# Patient Record
Sex: MALE | Race: WHITE | NOT HISPANIC OR LATINO | Employment: FULL TIME | URBAN - METROPOLITAN AREA
[De-identification: names, ages, dates, MRNs, and addresses within clinical notes are randomized per-mention and may not be internally consistent; named-entity substitution may affect disease eponyms.]

---

## 2017-10-02 ENCOUNTER — RECORDS - HEALTHEAST (OUTPATIENT)
Dept: LAB | Facility: CLINIC | Age: 57
End: 2017-10-02

## 2017-10-02 LAB — PSA SERPL-MCNC: 1 NG/ML (ref 0–3.5)

## 2019-04-25 ENCOUNTER — RECORDS - HEALTHEAST (OUTPATIENT)
Dept: LAB | Facility: CLINIC | Age: 59
End: 2019-04-25

## 2019-04-25 LAB
ALBUMIN SERPL-MCNC: 4.2 G/DL (ref 3.5–5)
ALP SERPL-CCNC: 58 U/L (ref 45–120)
ALT SERPL W P-5'-P-CCNC: 18 U/L (ref 0–45)
ANION GAP SERPL CALCULATED.3IONS-SCNC: 11 MMOL/L (ref 5–18)
AST SERPL W P-5'-P-CCNC: 19 U/L (ref 0–40)
BILIRUB SERPL-MCNC: 0.5 MG/DL (ref 0–1)
BUN SERPL-MCNC: 14 MG/DL (ref 8–22)
CALCIUM SERPL-MCNC: 9.8 MG/DL (ref 8.5–10.5)
CHLORIDE BLD-SCNC: 107 MMOL/L (ref 98–107)
CHOLEST SERPL-MCNC: 219 MG/DL
CO2 SERPL-SCNC: 22 MMOL/L (ref 22–31)
CREAT SERPL-MCNC: 1.15 MG/DL (ref 0.7–1.3)
FASTING STATUS PATIENT QL REPORTED: ABNORMAL
GFR SERPL CREATININE-BSD FRML MDRD: >60 ML/MIN/1.73M2
GLUCOSE BLD-MCNC: 92 MG/DL (ref 70–125)
HDLC SERPL-MCNC: 71 MG/DL
LDLC SERPL CALC-MCNC: 133 MG/DL
POTASSIUM BLD-SCNC: 4.9 MMOL/L (ref 3.5–5)
PROT SERPL-MCNC: 7.2 G/DL (ref 6–8)
PSA SERPL-MCNC: 1.1 NG/ML (ref 0–3.5)
SODIUM SERPL-SCNC: 140 MMOL/L (ref 136–145)
TRIGL SERPL-MCNC: 73 MG/DL

## 2020-07-23 ENCOUNTER — OFFICE VISIT (OUTPATIENT)
Dept: FAMILY MEDICINE | Facility: CLINIC | Age: 60
End: 2020-07-23
Payer: COMMERCIAL

## 2020-07-23 VITALS
SYSTOLIC BLOOD PRESSURE: 154 MMHG | HEART RATE: 75 BPM | TEMPERATURE: 97.5 F | RESPIRATION RATE: 16 BRPM | OXYGEN SATURATION: 97 % | DIASTOLIC BLOOD PRESSURE: 96 MMHG | HEIGHT: 68 IN | BODY MASS INDEX: 22.61 KG/M2 | WEIGHT: 149.2 LBS

## 2020-07-23 DIAGNOSIS — L08.9 FINGER INFECTION: Primary | ICD-10-CM

## 2020-07-23 DIAGNOSIS — Z23 ENCOUNTER FOR IMMUNIZATION: ICD-10-CM

## 2020-07-23 DIAGNOSIS — Z13.6 SCREENING FOR HYPERTENSION: ICD-10-CM

## 2020-07-23 PROCEDURE — 90471 IMMUNIZATION ADMIN: CPT | Performed by: FAMILY MEDICINE

## 2020-07-23 PROCEDURE — 99202 OFFICE O/P NEW SF 15 MIN: CPT | Mod: 25 | Performed by: FAMILY MEDICINE

## 2020-07-23 PROCEDURE — 90714 TD VACC NO PRESV 7 YRS+ IM: CPT | Performed by: FAMILY MEDICINE

## 2020-07-23 RX ORDER — CEPHALEXIN 500 MG/1
500 CAPSULE ORAL 4 TIMES DAILY
Qty: 20 CAPSULE | Refills: 0 | Status: SHIPPED | OUTPATIENT
Start: 2020-07-23 | End: 2020-07-28

## 2020-07-23 ASSESSMENT — MIFFLIN-ST. JEOR: SCORE: 1471.27

## 2020-07-23 NOTE — PROGRESS NOTES
"Subjective     Paul Canseco is a 60 year old male who presents to clinic today for the following health issues:    HPI     Chief Complaint   Patient presents with     Finger     something under fingernail- possibly wax     Patient states about a month ago he was re tiling in his house. Patient states that the tiles came with wax on them. Patient was scraping the wax off the tile with his right thumb nail. Patient states his thumb slowly got sore after that and not it red and swollen and painful if he bumps it. Patient states it looks infected. 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       Current Outpatient Medications   Medication Instructions     ASPIRIN 325 MG OR TBEC ONE DAILY       Patient Active Problem List   Diagnosis     CEREBROVASCULAR ACCIDENT     Hyperlipidemia with target LDL less than 130     Family history of thyroid disease       Blood pressure (!) 154/96, pulse 75, temperature 97.5  F (36.4  C), temperature source Tympanic, resp. rate 16, height 1.727 m (5' 8\"), weight 67.7 kg (149 lb 3.2 oz), SpO2 97 %.    Exam:  GENERAL APPEARANCE: healthy, alert and no distress  SKIN: on the radial side of the right thumb there is redness and tenderness and minimal swelling. There is no purulence. No lymphangitic streaking.   PSYCH: mentation appears normal and affect normal/bright    (L08.9) Finger infection  (primary encounter diagnosis)  Comment:   Plan: cephALEXin (KEFLEX) 500 MG capsule        Start the med today and take 2 pills now and 2 at bedtime today, then start 4 times daily for a total of 5 days. Elevate the finger and avoid local trauma.   Recheck right away if this is worsening. Modify use of the hand. Tylenol and advil may be used. Follow up as needed. The Adacel or tetanus is done today.     (Z13.6) Screening for hypertension  Comment:   Plan: For the Blood pressure, monitor and record this at rest, and the goal for the average is under 130/80.   Use the " non drug therapies. If the BP is high then bring these into the PCP      Ryley Bolivar MD    .

## 2020-07-23 NOTE — PATIENT INSTRUCTIONS
(L08.9) Finger infection  (primary encounter diagnosis)  Comment:   Plan: cephALEXin (KEFLEX) 500 MG capsule        Start the med today and take 2 pills now and 2 at bedtime today, then start 4 times daily for a total of 5 days. Elevate the finger and avoid local trauma.   Recheck right away if this is worsening. Modify use of the hand. Tylenol and advil may be used. Follow up as needed. The Adacel or tetanus is done today.     (Z13.6) Screening for hypertension  Comment:   Plan: For the Blood pressure, monitor and record this at rest, and the goal for the average is under 130/80.   Use the non drug therapies. If the BP is high then bring these into the PCP

## 2020-08-03 DIAGNOSIS — L08.9 FINGER INFECTION: ICD-10-CM

## 2020-08-04 RX ORDER — CEPHALEXIN 500 MG/1
500 CAPSULE ORAL 4 TIMES DAILY
Qty: 20 CAPSULE | Refills: 0 | OUTPATIENT
Start: 2020-08-04 | End: 2020-08-09

## 2020-09-17 ENCOUNTER — RECORDS - HEALTHEAST (OUTPATIENT)
Dept: LAB | Facility: CLINIC | Age: 60
End: 2020-09-17

## 2020-09-17 LAB
ANION GAP SERPL CALCULATED.3IONS-SCNC: 11 MMOL/L (ref 5–18)
BUN SERPL-MCNC: 12 MG/DL (ref 8–22)
CALCIUM SERPL-MCNC: 9.6 MG/DL (ref 8.5–10.5)
CHLORIDE BLD-SCNC: 106 MMOL/L (ref 98–107)
CHOLEST SERPL-MCNC: 205 MG/DL
CO2 SERPL-SCNC: 22 MMOL/L (ref 22–31)
CREAT SERPL-MCNC: 0.84 MG/DL (ref 0.7–1.3)
FASTING STATUS PATIENT QL REPORTED: ABNORMAL
GFR SERPL CREATININE-BSD FRML MDRD: >60 ML/MIN/1.73M2
GLUCOSE BLD-MCNC: 92 MG/DL (ref 70–125)
HDLC SERPL-MCNC: 68 MG/DL
LDLC SERPL CALC-MCNC: 123 MG/DL
POTASSIUM BLD-SCNC: 5.3 MMOL/L (ref 3.5–5)
PSA SERPL-MCNC: 1.5 NG/ML (ref 0–3.5)
SODIUM SERPL-SCNC: 139 MMOL/L (ref 136–145)
TRIGL SERPL-MCNC: 70 MG/DL

## 2020-11-24 ENCOUNTER — OFFICE VISIT (OUTPATIENT)
Dept: FAMILY MEDICINE | Facility: CLINIC | Age: 60
End: 2020-11-24
Payer: COMMERCIAL

## 2020-11-24 VITALS
DIASTOLIC BLOOD PRESSURE: 66 MMHG | HEART RATE: 83 BPM | WEIGHT: 149 LBS | OXYGEN SATURATION: 97 % | RESPIRATION RATE: 18 BRPM | TEMPERATURE: 97 F | BODY MASS INDEX: 22.58 KG/M2 | SYSTOLIC BLOOD PRESSURE: 140 MMHG | HEIGHT: 68 IN

## 2020-11-24 DIAGNOSIS — L03.011 PARONYCHIA, FINGER, RIGHT: Primary | ICD-10-CM

## 2020-11-24 PROCEDURE — 99213 OFFICE O/P EST LOW 20 MIN: CPT | Performed by: FAMILY MEDICINE

## 2020-11-24 RX ORDER — CEFADROXIL 500 MG/1
500 CAPSULE ORAL 2 TIMES DAILY
Qty: 14 CAPSULE | Refills: 0 | Status: SHIPPED | OUTPATIENT
Start: 2020-11-24 | End: 2020-12-02

## 2020-11-24 ASSESSMENT — MIFFLIN-ST. JEOR: SCORE: 1470.36

## 2020-11-24 NOTE — PROGRESS NOTES
"Subjective     Paul Canseco is a 60 year old male who presents to clinic today for the following health issues:    HPI         Concern - R thumb has wax on the end of thumb   Onset: 7-2020  Description: Patient was putting tile down and felt something go into his thumb, ever since has had trouble with swelling and pain, patient has  Soaked  it to try to push some of the wax to the surface.  Intensity: moderate, severe at times   Progression of Symptoms:  Worsening with swollen and infection   Accompanying Signs & Symptoms: patient was scabbing tile and got some wax inhis thumb     Precipitating factors:        Worsened by: with use   Alleviating factors:        Improved by: none   Therapies tried and outcome: Patient has been soaking and trying to push the wax to the surface     Patient reports that since July he has been struggling with the wax in his thumb.  He was prescribed Keflex for treatment at that time.  He has been soaking finger and trying to push the wax to the surface.  He states that 4 days ago it became infected and he was able to squeeze out some wax and also purulent material.  He continues to still have pain of the thumb and notes that the wax is still there.  Today in clinic after pressing on the thumb purulent material was expressed and a chunk of wax was removed.   Patient denies any fevers, chills, rigors, spreading erythema or any other concerns at this time.    Review of Systems   Constitutional: Negative fevers   HEENT: Negative vision changes, hearing changes, difficulty with swallowing.  CV: Negative chest pain   Resp: Negative shortness of breath, significant cough, wheezing  GI: Negative nausea, vomiting, diarrhea, constipation, blood in stool  MSK: thumb pain         Objective    BP (!) 140/66   Pulse 83   Temp 97  F (36.1  C) (Tympanic)   Resp 18   Ht 1.727 m (5' 8\")   Wt 67.6 kg (149 lb)   SpO2 97%   BMI 22.66 kg/m    Body mass index is 22.66 kg/m .  Physical Exam   General: " Alert, cooperative no acute distress  Hand: Right thumb is erythematous with the piece of tan-colored wax present.  After manipulation of the thumb there was expression of moderate amount of purulent smelly material.  The piece of wax was removed manually.  There is no signs of any streaking erythema into the wrist or forearm.            Assessment & Plan     Paul was seen today for thumb discomfort.    Diagnoses and all orders for this visit:    Paronychia, finger, right  -Patient with chronic paronychia and foreign body present in the right thumb.  After manual pressure purulent material was expressed and wax was removed manually.  Patient tolerated the procedure well.  Discussed with patient that he will need to keep the area clean and dry when he is working.  Advised that he proceed with warm water soaks 2 times a day for at least 10 minutes.  Will treat with Duricef 500 mg twice daily for 7 days. Medication discussed. No MRSA risk factors. Allergy to penicillin, tolerated keflex in July 2020.  Patient to follow-up in clinic in 7 days.  All questions answered.  -     cefadroxil (DURICEF) 500 MG capsule; Take 1 capsule (500 mg) by mouth 2 times daily              Return in about 1 week (around 12/1/2020).    Brijesh Barlow, [facility]

## 2020-12-02 ENCOUNTER — OFFICE VISIT (OUTPATIENT)
Dept: DERMATOLOGY | Facility: CLINIC | Age: 60
End: 2020-12-02
Payer: COMMERCIAL

## 2020-12-02 ENCOUNTER — TELEPHONE (OUTPATIENT)
Dept: FAMILY MEDICINE | Facility: CLINIC | Age: 60
End: 2020-12-02

## 2020-12-02 ENCOUNTER — ANCILLARY PROCEDURE (OUTPATIENT)
Dept: GENERAL RADIOLOGY | Facility: CLINIC | Age: 60
End: 2020-12-02
Attending: FAMILY MEDICINE
Payer: COMMERCIAL

## 2020-12-02 ENCOUNTER — TELEPHONE (OUTPATIENT)
Dept: DERMATOLOGY | Facility: CLINIC | Age: 60
End: 2020-12-02

## 2020-12-02 ENCOUNTER — OFFICE VISIT (OUTPATIENT)
Dept: FAMILY MEDICINE | Facility: CLINIC | Age: 60
End: 2020-12-02
Payer: COMMERCIAL

## 2020-12-02 VITALS
TEMPERATURE: 97 F | WEIGHT: 149 LBS | OXYGEN SATURATION: 98 % | SYSTOLIC BLOOD PRESSURE: 130 MMHG | HEART RATE: 74 BPM | HEIGHT: 68 IN | DIASTOLIC BLOOD PRESSURE: 82 MMHG | BODY MASS INDEX: 22.58 KG/M2 | RESPIRATION RATE: 16 BRPM

## 2020-12-02 VITALS — SYSTOLIC BLOOD PRESSURE: 130 MMHG | RESPIRATION RATE: 16 BRPM | HEART RATE: 83 BPM | DIASTOLIC BLOOD PRESSURE: 78 MMHG

## 2020-12-02 DIAGNOSIS — M79.5 FOREIGN BODY (FB) IN SOFT TISSUE: Primary | ICD-10-CM

## 2020-12-02 DIAGNOSIS — M79.644 THUMB PAIN, RIGHT: ICD-10-CM

## 2020-12-02 DIAGNOSIS — S60.351D FOREIGN BODY OF RIGHT THUMB, SUBSEQUENT ENCOUNTER: ICD-10-CM

## 2020-12-02 DIAGNOSIS — M79.644 THUMB PAIN, RIGHT: Primary | ICD-10-CM

## 2020-12-02 PROCEDURE — 99213 OFFICE O/P EST LOW 20 MIN: CPT | Performed by: FAMILY MEDICINE

## 2020-12-02 PROCEDURE — 99202 OFFICE O/P NEW SF 15 MIN: CPT | Performed by: DERMATOLOGY

## 2020-12-02 PROCEDURE — 73140 X-RAY EXAM OF FINGER(S): CPT | Mod: RT | Performed by: RADIOLOGY

## 2020-12-02 RX ORDER — DOXYCYCLINE 100 MG/1
100 CAPSULE ORAL 2 TIMES DAILY
Qty: 14 CAPSULE | Refills: 0 | Status: SHIPPED | OUTPATIENT
Start: 2020-12-02

## 2020-12-02 ASSESSMENT — MIFFLIN-ST. JEOR: SCORE: 1470.36

## 2020-12-02 NOTE — PROGRESS NOTES
"Subjective     Paul Canseco is a 60 year old male who presents to clinic today for the following health issues:    HPI         Concern - THUMB  Onset: July 2020  Description: was helping a friend christine when wax was imbedded   Intensity: 8/10  Progression of Symptoms:  same  Accompanying Signs & Symptoms: nail on thumb - patient has been picking away at it.  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    -He returns to clinic today for concern that there is still wax in his thumb.  He has been taking the Duricef medication as prescribed, and also soaking the thumb. Finished Duricef Monday night. He has not noted any fevers, chills or rigors.  No streaking or redness up thumb or hand.  Continues to have pain in the right thumb and thinks it is infected again.     Review of Systems   Constitutional, HEENT, cardiovascular, pulmonary, gi and gu systems are negative, except as otherwise noted.      Objective    /82   Pulse 74   Temp 97  F (36.1  C) (Tympanic)   Resp 16   Ht 1.727 m (5' 8\")   Wt 67.6 kg (149 lb)   SpO2 98%   BMI 22.66 kg/m    Body mass index is 22.66 kg/m .  Physical Exam   General: Alert, cooperative no acute distress  Right hand: On the radial aspect of his right thumb there is mild erythema.  There is an opening near the radial nail bed. Tender to palpation over the distal thumb. No streaking up the thumb or forearm. Pain with flexion at IP joint. No pain with extension. Sensation intact over the thumb and hand. Radial pulse strong. No pain or decrease ROM in other digits of the hand.     Xray - IMPRESSION: Soft tissue wound/ulcer. There is bone deficiency along  the radial aspect of the thumb distal phalanx. This has a chronic  appearance and could relate to prior infection, prior surgery, or " old  injury. No radiopaque foreign body identified.          Assessment & Plan     Paul was seen today for thumb discomfort.    Diagnoses and all orders for this visit:    52-year-old male with initial injury/encounter in July 2020.  He was tiling that way extremity the finger.  He was placed on Keflex and sent home at that time.  Patient returned on 11/24 and at that time had a large piece of wax on the surface of the finger which was removed with gentle traction.  Also had drainage from the thumb after wax removal.  He was placed on Duricef and instructed to proceed with daily soaks. With plans to return for further evaluation. Today returns with concerns of recurrent infection and concerns for retained wax. Xray was obtained as above. Will have patient be seen be Dermatology later today for further evaluation and cares. Greatly appreciate Dermatology seeing patient.   Thumb pain, right  -     XR Finger Right G/E 2 Views; Future  Dermatology referral     Likely Foreign body of right thumb, subsequent encounter  - Dermatology referral           Follow up with Dermatology today.     Brijesh Barlow, [facility]

## 2020-12-02 NOTE — PATIENT INSTRUCTIONS
Orthopedics today at 240     Ortho to determine need for antibiotics. If wish for myself to prescribe I will.

## 2020-12-02 NOTE — TELEPHONE ENCOUNTER
Attempting to work in with Delores in [facility] this afternoon.  Madelin Malhotra RN

## 2020-12-02 NOTE — TELEPHONE ENCOUNTER
Reason for Call:  Other call back    Detailed comments: pt wife calling upset that pt was seen in office today and nothing some done to have wax taken out of his finger. Pt was seen at a different clinic and was referred to Dr. Estrella thinking he was going to have this taken care of today.     Phone Number Patient can be reached at: Other phone number:  [phone] or [phone] - Aisha    Best Time: any     Can we leave a detailed message on this number? YES    Call taken on 12/2/2020 at 3:22 PM by Genesis Kamara

## 2020-12-02 NOTE — PROGRESS NOTES
Paul Canseco is an extremely pleasant 60 year old year old male patient here today for foreign body in right thumb.   .  Patient states this has been present for months.  Patient reports the following symptoms:  painful.  Patient reports the following previous treatments excisoin.  These treatments did not work.  Patient reports the following modifying factors none.  Associated symptoms: none.  Patient has no other skin complaints today.  Remainder of the HPI, Meds, PMH, Allergies, FH, and SH was reviewed in chart.    No past medical history on file.    Past Surgical History:   Procedure Laterality Date     COLONOSCOPY  2012    Procedure:COLONOSCOPY; Colonoscopy-Dr. Jaeger; Surgeon:SOTO TOM; Location:[facility]     VASECTOMY          Family History   Problem Relation Age of Onset     Breast Cancer Mother         late 50's; lives in [place]     Arthritis Mother      Breast Cancer Maternal Grandmother      Cerebrovascular Disease Paternal Grandfather         late 60's, ETOH     Thyroid Disease Sister        Social History     Socioeconomic History     Marital status:      Spouse name: Aisha Canseco     Number of children: 2     Years of education: 12+     Highest education level: Not on file   Occupational History     Occupation:      Employer: SELF EMPLOYED.   Social Needs     Financial resource strain: Not on file     Food insecurity     Worry: Not on file     Inability: Not on file     Transportation needs     Medical: Not on file     Non-medical: Not on file   Tobacco Use     Smoking status: Former Smoker     Quit date: 2006     Years since quittin.0     Smokeless tobacco: Never Used     Tobacco comment: cigar once a week   Substance and Sexual Activity     Alcohol use: Yes     Comment: 2 beers a day     Drug use: No     Sexual activity: Yes     Partners: Female     Birth control/protection: Surgical     Comment: Patient has had a vasectomy   Lifestyle     Physical activity      Days per week: Not on file     Minutes per session: Not on file     Stress: Not on file   Relationships     Social connections     Talks on phone: Not on file     Gets together: Not on file     Attends [employer] service: Not on file     Active member of club or organization: Not on file     Attends meetings of clubs or organizations: Not on file     Relationship status: Not on file     Intimate partner violence     Fear of current or ex partner: Not on file     Emotionally abused: Not on file     Physically abused: Not on file     Forced sexual activity: Not on file   Other Topics Concern     Parent/sibling w/ CABG, MI or angioplasty before 65F 55M? No   Social History Narrative     Not on file       Outpatient Encounter Medications as of 12/2/2020   Medication Sig Dispense Refill     ASPIRIN 325 MG OR TBEC ONE DAILY 100 3     Omega-3 Fatty Acids (FISH OIL OMEGA-3 PO)        No facility-administered encounter medications on file as of 12/2/2020.              Review Of Systems  Skin: As above  Eyes: negative  Ears/Nose/Throat: negative  Respiratory: No shortness of breath, dyspnea on exertion, cough, or hemoptysis  Cardiovascular: negative  Gastrointestinal: negative  Genitourinary: negative  Musculoskeletal: negative  Neurologic: negative  Psychiatric: negative  Hematologic/Lymphatic/Immunologic: negative  Endocrine: negative      O:   NAD, WDWN, Alert & Oriented, Mood & Affect wnl, Vitals stable   Here today alone   /78 (BP Location: Right arm, Patient Position: Sitting, Cuff Size: Adult Regular)   Pulse 83   Resp 16    General appearance normal   Vitals stable   Alert, oriented and in no acute distress     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      Eyes: Conjunctivae/lids:Normal     ENT:  Lips, buccal mucosa, tongue: normal    MSK:Normal    Cardiovascular: peripheral edema none    Pulm: Breathing Normal    Neuro/Psych: Orientation:Alert and Orientedx3 ; Mood/Affect:normal       A/P:  1. Foreign body  Nail avulsion and exciision discussed with patient   Will schedule Monday  Doxy twice daily

## 2020-12-02 NOTE — TELEPHONE ENCOUNTER
Reason for call:  Patient reporting a symptom    Symptom or request: Pt's spouse calling, very angry - Pt in background.  Pt was seen in clinic today for a finger foreign body and was set up with [facility] for appt today.  [facility] called and cancelled that appt and told pt that he needs to be seen @ [facility].  Please call patient/spouse and advise ASAP.      Duration (how long have symptoms been present): ongoing    Have you been treated for this before? Yes    Additional comments:     Phone Number patient can be reached at:  Home number on file [phone] (home)    Best Time:  any    Can we leave a detailed message on this number:  YES    Call taken on 12/2/2020 at 10:26 AM by Selena Solorio

## 2020-12-02 NOTE — NURSING NOTE
"Initial /78 (BP Location: Right arm, Patient Position: Sitting, Cuff Size: Adult Regular)   Pulse 83   Resp 16  Estimated body mass index is 22.66 kg/m  as calculated from the following:    Height as of an earlier encounter on 12/2/20: 1.727 m (5' 8\").    Weight as of an earlier encounter on 12/2/20: 67.6 kg (149 lb). .    Tessa Ortiz, [facility]    "

## 2020-12-02 NOTE — LETTER
2020         RE: Paul Canseco  [id number] [address] [facility] [address]        Dear Colleague,    Thank you for referring your patient, Paul Canseco, to the [facility]. Please see a copy of my visit note below.    Paul Canseco is an extremely pleasant 60 year old year old male patient here today for foreign body in right thumb.   .  Patient states this has been present for months.  Patient reports the following symptoms:  painful.  Patient reports the following previous treatments excisoin.  These treatments did not work.  Patient reports the following modifying factors none.  Associated symptoms: none.  Patient has no other skin complaints today.  Remainder of the HPI, Meds, PMH, Allergies, FH, and SH was reviewed in chart.    No past medical history on file.    Past Surgical History:   Procedure Laterality Date     COLONOSCOPY  2012    Procedure:COLONOSCOPY; Colonoscopy-Dr. Jaeger; Surgeon:SOTO TOM; Location:[facility]     VASECTOMY          Family History   Problem Relation Age of Onset     Breast Cancer Mother         late 50's; lives in [place]     Arthritis Mother      Breast Cancer Maternal Grandmother      Cerebrovascular Disease Paternal Grandfather         late 60's, ETOH     Thyroid Disease Sister        Social History     Socioeconomic History     Marital status:      Spouse name: Aisha Canseco     Number of children: 2     Years of education: 12+     Highest education level: Not on file   Occupational History     Occupation:      Employer: SELF EMPLOYED.   Social Needs     Financial resource strain: Not on file     Food insecurity     Worry: Not on file     Inability: Not on file     Transportation needs     Medical: Not on file     Non-medical: Not on file   Tobacco Use     Smoking status: Former Smoker     Quit date: 2006     Years since quittin.0     Smokeless tobacco: Never Used     Tobacco comment: cigar once a week   Substance  and Sexual Activity     Alcohol use: Yes     Comment: 2 beers a day     Drug use: No     Sexual activity: Yes     Partners: Female     Birth control/protection: Surgical     Comment: Patient has had a vasectomy   Lifestyle     Physical activity     Days per week: Not on file     Minutes per session: Not on file     Stress: Not on file   Relationships     Social connections     Talks on phone: Not on file     Gets together: Not on file     Attends [employer] service: Not on file     Active member of club or organization: Not on file     Attends meetings of clubs or organizations: Not on file     Relationship status: Not on file     Intimate partner violence     Fear of current or ex partner: Not on file     Emotionally abused: Not on file     Physically abused: Not on file     Forced sexual activity: Not on file   Other Topics Concern     Parent/sibling w/ CABG, MI or angioplasty before 65F 55M? No   Social History Narrative     Not on file       Outpatient Encounter Medications as of 12/2/2020   Medication Sig Dispense Refill     ASPIRIN 325 MG OR TBEC ONE DAILY 100 3     Omega-3 Fatty Acids (FISH OIL OMEGA-3 PO)        No facility-administered encounter medications on file as of 12/2/2020.              Review Of Systems  Skin: As above  Eyes: negative  Ears/Nose/Throat: negative  Respiratory: No shortness of breath, dyspnea on exertion, cough, or hemoptysis  Cardiovascular: negative  Gastrointestinal: negative  Genitourinary: negative  Musculoskeletal: negative  Neurologic: negative  Psychiatric: negative  Hematologic/Lymphatic/Immunologic: negative  Endocrine: negative      O:   NAD, WDWN, Alert & Oriented, Mood & Affect wnl, Vitals stable   Here today alone   /78 (BP Location: Right arm, Patient Position: Sitting, Cuff Size: Adult Regular)   Pulse 83   Resp 16    General appearance normal   Vitals stable   Alert, oriented and in no acute distress     R thumb proximal naul fold dystrophy with keratotic red  papule under nail   Tender and indruated   The remainder of expanded problem focused exam was normal; the following areas were examined:  , conjunctiva/lids, face, neck, , chest, digits/nails, RUE, LUE.      Eyes: Conjunctivae/lids:Normal     ENT: Lips, buccal mucosa, tongue: normal    MSK:Normal    Cardiovascular: peripheral edema none    Pulm: Breathing Normal    Neuro/Psych: Orientation:Alert and Orientedx3 ; Mood/Affect:normal       A/P:  1. Foreign body  Nail avulsion and exciision discussed with patient   Will schedule Monday  Doxy twice daily        Again, thank you for allowing me to participate in the care of your patient.        Sincerely,        Juan Diego Estrella MD

## 2020-12-03 NOTE — TELEPHONE ENCOUNTER
Hello    This was a consult.    I had no idea what I was going to see.    He got schedule for the procedure next Monday.      There is no office charge for the procedure visit.  Only the consult.

## 2020-12-03 NOTE — TELEPHONE ENCOUNTER
"Called patient and was given consent to communicate with wife- Aisha. Called Aisha. Patient had appointment 12/2/2020 @ 2:30 pm.    She stated they were told at time of scheduling  appointment that Dr. Estrella \"would take care of it today\". Was under assumption that meant that procedure was going to take place, \"otherwise we would've just went to [facility]\".     Apologized for mis-communication regarding appointment and informed her of general process of consults taking place before procedures. Wife stated \"we get 3 free office visits a year, we will not be having this count as one of them. We will not be paying for it either\".     Patient's wife understanding on phone, but frustrated and  requesting call by management regarding, \"having this bill stopped ASAP\".  Barb can you help out with this and call patient's wife? Okay to call per patient- [phone]- Aisha    Discussed with staff that were present during office visit. Patient was seen as a consult. Double booked for appointment to accommodate need to be seen. Per staff, Dr. Estrella spent ample amount of time with patient discussing and assessing thumb, then  formulated plan and scheduled procedure for following Monday.     Thank you,   Erich CAMPO           "

## 2020-12-03 NOTE — TELEPHONE ENCOUNTER
Discussed with JAHAIRA Day-PCS. She will handle with billing. Informed patient's wife. Thankful for assistance and does not require further follow up at this time.     Erich PALACIOS RN   [facility]

## 2020-12-15 ENCOUNTER — OFFICE VISIT (OUTPATIENT)
Dept: DERMATOLOGY | Facility: CLINIC | Age: 60
End: 2020-12-15
Payer: COMMERCIAL

## 2020-12-15 VITALS — SYSTOLIC BLOOD PRESSURE: 154 MMHG | DIASTOLIC BLOOD PRESSURE: 90 MMHG | HEART RATE: 72 BPM | OXYGEN SATURATION: 98 %

## 2020-12-15 DIAGNOSIS — M79.5 FOREIGN BODY (FB) IN SOFT TISSUE: Primary | ICD-10-CM

## 2020-12-15 PROCEDURE — 11750 EXCISION NAIL&NAIL MATRIX: CPT | Performed by: DERMATOLOGY

## 2020-12-15 RX ORDER — DOXYCYCLINE 100 MG/1
100 CAPSULE ORAL 2 TIMES DAILY
Qty: 14 CAPSULE | Refills: 0 | Status: SHIPPED | OUTPATIENT
Start: 2020-12-15 | End: 2020-12-24

## 2020-12-15 NOTE — NURSING NOTE
Chief Complaint   Patient presents with     Derm Problem     thumb nail removal       Vitals:    12/15/20 0817   BP: (!) 154/90   Pulse: 72   SpO2: 98%     Wt Readings from Last 1 Encounters:   12/02/20 67.6 kg (149 lb)       Loreto Rdz LPN.................12/15/2020

## 2020-12-15 NOTE — PATIENT INSTRUCTIONS
Open Wound Care     for ______________        ? No strenuous activity for 48 hours    ? Take Tylenol as needed for discomfort.                                                .         ? Do not drink alcoholic beverages for 48 hours.    ? Keep the pressure bandage in place for 24 hours. If the bandage becomes blood tinged or loose, reinforce it with gauze and tape.        (Refer to the reverse side of this page for management of bleeding).    ? Remove bandage in 24 hours and begin wound care as follows:     1. Clean area with tap water using a Q tip or gauze pad, (shower / bathe normally)  2. Dry wound with Q tip or gauze pad  3. Apply Aquaphor, Vaseline, Polysporin or Bacitracin Ointment with a Q tip    Do NOT use Neosporin Ointment *  4. Cover the wound with a telfa, then gauze and wrap with Coban.  5. Repeat wound care once a day until wound is completely healed.    It is an old wives tale that a wound heals better when it is exposed to air and allowed to dry out. The wound will heal faster with a better cosmetic result if it is kept moist with ointment and covered with a bandage.  Do not let the wound dry out.      Supplies Needed:                Qtips or gauze pads                Polysporin or Bacitracin Ointment                Bandaids or nonstick gauze pads and paper tape    Wound care kits and brown paper tape are available for purchase at   the pharmacy.    BLEEDIN. Use tightly rolled up gauze or cloth to apply direct pressure over the bandage for 20   minutes.  2. Reapply pressure for an additional 20 minutes if necessary  3. Call the office or go to the nearest emergency room if pressure fails to stop the bleeding.  4. Use additional gauze and tape to maintain pressure once the bleeding has stopped.  5. Begin wound care 24 hours after surgery as directed.                  WOUND HEALING    1. One week after surgery a pink / red halo will form around the outside of the wound.   This is new  skin.  2. The center of the wound will appear yellowish white and produce some drainage.  3. The pink halo will slowly migrate in toward the center of the wound until the wound is covered with new shiny pink skin.  4. There will be no more drainage when the wound is completely healed.  5. It will take six months to one year for the redness to fade.  6. The scar may be itchy, tight and sensitive to extreme temperatures for a year after the surgery.  7. Massaging the area several times a day for several minutes after the wound is completely healed will help the scar soften and normalize faster. Begin massage only after healing is complete.      In case of emergency call: Dr Estrella: [phone]     [facility]: [phone]    [facility]: [phone]

## 2020-12-15 NOTE — PROGRESS NOTES
Paul Canseco is an extremely pleasant 60 year old year old male patient here today for for foreign body in thumb.  He notes he got wax in thumb.   .  Patient states this has been present for months.  Patient reports the following symptoms:  painful.  Patient reports the following previous treatments I+d.  These treatments did not work.  Patient reports the following modifying factors none.  Associated symptoms: none.  Patient has no other skin complaints today.  Remainder of the HPI, Meds, PMH, Allergies, FH, and SH was reviewed in chart.    History reviewed. No pertinent past medical history.    Past Surgical History:   Procedure Laterality Date     COLONOSCOPY  2012    Procedure:COLONOSCOPY; Colonoscopy-Dr. Jaeger; Surgeon:SOTO TOM; Location:[facility]     VASECTOMY          Family History   Problem Relation Age of Onset     Breast Cancer Mother         late 50's; lives in [place]     Arthritis Mother      Breast Cancer Maternal Grandmother      Cerebrovascular Disease Paternal Grandfather         late 60's, ETOH     Thyroid Disease Sister        Social History     Socioeconomic History     Marital status:      Spouse name: Aisha Canseco     Number of children: 2     Years of education: 12+     Highest education level: Not on file   Occupational History     Occupation:      Employer: SELF EMPLOYED.   Social Needs     Financial resource strain: Not on file     Food insecurity     Worry: Not on file     Inability: Not on file     Transportation needs     Medical: Not on file     Non-medical: Not on file   Tobacco Use     Smoking status: Former Smoker     Quit date: 2006     Years since quittin.0     Smokeless tobacco: Never Used     Tobacco comment: cigar once a week   Substance and Sexual Activity     Alcohol use: Yes     Comment: 2 beers a day     Drug use: No     Sexual activity: Yes     Partners: Female     Birth control/protection: Surgical     Comment: Patient has had a  vasectomy   Lifestyle     Physical activity     Days per week: Not on file     Minutes per session: Not on file     Stress: Not on file   Relationships     Social connections     Talks on phone: Not on file     Gets together: Not on file     Attends Yarsani service: Not on file     Active member of club or organization: Not on file     Attends meetings of clubs or organizations: Not on file     Relationship status: Not on file     Intimate partner violence     Fear of current or ex partner: Not on file     Emotionally abused: Not on file     Physically abused: Not on file     Forced sexual activity: Not on file   Other Topics Concern     Parent/sibling w/ CABG, MI or angioplasty before 65F 55M? No   Social History Narrative     Not on file       Outpatient Encounter Medications as of 12/15/2020   Medication Sig Dispense Refill     ASPIRIN 325 MG OR TBEC ONE DAILY 100 3     Omega-3 Fatty Acids (FISH OIL OMEGA-3 PO)        doxycycline monohydrate (MONODOX) 100 MG capsule Take 1 capsule (100 mg) by mouth 2 times daily (Patient not taking: Reported on 12/15/2020) 14 capsule 0     No facility-administered encounter medications on file as of 12/15/2020.              Review Of Systems  Skin: As above  Eyes: negative  Ears/Nose/Throat: negative  Respiratory: No shortness of breath, dyspnea on exertion, cough, or hemoptysis  Cardiovascular: negative  Gastrointestinal: negative  Genitourinary: negative  Musculoskeletal: negative  Neurologic: negative  Psychiatric: negative  Hematologic/Lymphatic/Immunologic: negative  Endocrine: negative      O:   NAD, WDWN, Alert & Oriented, Mood & Affect wnl, Vitals stable   Here today alone   BP (!) 154/90   Pulse 72   SpO2 98%    General appearance normal   Vitals stable   Alert, oriented and in no acute distress     R thumb with medial dystrophy and subungual debirs      Eyes: Conjunctivae/lids:Normal     ENT: Lips, buccal mucosa, tongue: normal    MSK:Normal    Cardiovascular:  peripheral edema none    Pulm: Breathing Normal    Neuro/Psych: Orientation:Alert and Orientedx3 ; Mood/Affect:normal       A/P:  1. R thumb foreign body  NAIL AVULSION After consent, anesthesia with 1% lidocaine, and prep, the lateral portion of the nail was elevated from the nail bed, using a periosteal elevator, the lateral 4mm of the nail was avulsed using sterile nail splitter, cautery was applied to matrix at a current of 10, for 5 seconds and then repeated.      The medial nail fold was drained of 2cc of a waxy white material    The site was dressed in the usual fashion,   Management: Home Instructions   Keep foot elevated for first 24 hours   Change dressing in 24 hours   Consider daily antibiotic ointment (e.g. Bacitracin) until heeled   Water exposure is controversial   Some recommend only showering, but no soakings   Others soak foot in warm soapy water 2-4 times daily for 4-7 days   Avoid trauma to toe for first 2 weeks   Wear loose-fitting shoes   Avoid Running, jumping or other potential injury   Observe for signs of infection     Return to clinic one month

## 2020-12-15 NOTE — LETTER
12/15/2020         RE: Paul Canseco  [address] [facility] [id number]        Dear Colleague,    Thank you for referring your patient, Paul Canseco, to the [facility]. Please see a copy of my visit note below.    Paul Canseco is an extremely pleasant 60 year old year old male patient here today for for foreign body in thumb.  He notes he got wax in thumb.   .  Patient states this has been present for months.  Patient reports the following symptoms:  painful.  Patient reports the following previous treatments I+d.  These treatments did not work.  Patient reports the following modifying factors none.  Associated symptoms: none.  Patient has no other skin complaints today.  Remainder of the HPI, Meds, PMH, Allergies, FH, and SH was reviewed in chart.    History reviewed. No pertinent past medical history.    Past Surgical History:   Procedure Laterality Date     COLONOSCOPY  2012    Procedure:COLONOSCOPY; Colonoscopy-Dr. Jaeger; Surgeon:SOTO TOM; Location:[facility]     VASECTOMY          Family History   Problem Relation Age of Onset     Breast Cancer Mother         late 50's; lives in [place]     Arthritis Mother      Breast Cancer Maternal Grandmother      Cerebrovascular Disease Paternal Grandfather         late 60's, ETOH     Thyroid Disease Sister        Social History     Socioeconomic History     Marital status:      Spouse name: Aisha Canseco     Number of children: 2     Years of education: 12+     Highest education level: Not on file   Occupational History     Occupation:      Employer: SELF EMPLOYED.   Social Needs     Financial resource strain: Not on file     Food insecurity     Worry: Not on file     Inability: Not on file     Transportation needs     Medical: Not on file     Non-medical: Not on file   Tobacco Use     Smoking status: Former Smoker     Quit date: 2006     Years since quittin.0     Smokeless tobacco: Never Used      Tobacco comment: cigar once a week   Substance and Sexual Activity     Alcohol use: Yes     Comment: 2 beers a day     Drug use: No     Sexual activity: Yes     Partners: Female     Birth control/protection: Surgical     Comment: Patient has had a vasectomy   Lifestyle     Physical activity     Days per week: Not on file     Minutes per session: Not on file     Stress: Not on file   Relationships     Social connections     Talks on phone: Not on file     Gets together: Not on file     Attends Restoration service: Not on file     Active member of club or organization: Not on file     Attends meetings of clubs or organizations: Not on file     Relationship status: Not on file     Intimate partner violence     Fear of current or ex partner: Not on file     Emotionally abused: Not on file     Physically abused: Not on file     Forced sexual activity: Not on file   Other Topics Concern     Parent/sibling w/ CABG, MI or angioplasty before 65F 55M? No   Social History Narrative     Not on file       Outpatient Encounter Medications as of 12/15/2020   Medication Sig Dispense Refill     ASPIRIN 325 MG OR TBEC ONE DAILY 100 3     Omega-3 Fatty Acids (FISH OIL OMEGA-3 PO)        doxycycline monohydrate (MONODOX) 100 MG capsule Take 1 capsule (100 mg) by mouth 2 times daily (Patient not taking: Reported on 12/15/2020) 14 capsule 0     No facility-administered encounter medications on file as of 12/15/2020.              Review Of Systems  Skin: As above  Eyes: negative  Ears/Nose/Throat: negative  Respiratory: No shortness of breath, dyspnea on exertion, cough, or hemoptysis  Cardiovascular: negative  Gastrointestinal: negative  Genitourinary: negative  Musculoskeletal: negative  Neurologic: negative  Psychiatric: negative  Hematologic/Lymphatic/Immunologic: negative  Endocrine: negative      O:   NAD, WDWN, Alert & Oriented, Mood & Affect wnl, Vitals stable   Here today alone   BP (!) 154/90   Pulse 72   SpO2 98%    General  appearance normal   Vitals stable   Alert, oriented and in no acute distress     R thumb with medial dystrophy and subungual debirs      Eyes: Conjunctivae/lids:Normal     ENT: Lips, buccal mucosa, tongue: normal    MSK:Normal    Cardiovascular: peripheral edema none    Pulm: Breathing Normal    Neuro/Psych: Orientation:Alert and Orientedx3 ; Mood/Affect:normal       A/P:  1. R thumb foreign body  NAIL AVULSION After consent, anesthesia with 1% lidocaine, and prep, the lateral portion of the nail was elevated from the nail bed, using a periosteal elevator, the lateral 4mm of the nail was avulsed using sterile nail splitter, cautery was applied to matrix at a current of 10, for 5 seconds and then repeated.      The medial nail fold was drained of 2cc of a waxy white material    The site was dressed in the usual fashion,   Management: Home Instructions   Keep foot elevated for first 24 hours   Change dressing in 24 hours   Consider daily antibiotic ointment (e.g. Bacitracin) until heeled   Water exposure is controversial   Some recommend only showering, but no soakings   Others soak foot in warm soapy water 2-4 times daily for 4-7 days   Avoid trauma to toe for first 2 weeks   Wear loose-fitting shoes   Avoid Running, jumping or other potential injury   Observe for signs of infection     Return to clinic one month        Again, thank you for allowing me to participate in the care of your patient.        Sincerely,        Juan Diego Estrella MD

## 2020-12-23 ENCOUNTER — TELEPHONE (OUTPATIENT)
Dept: DERMATOLOGY | Facility: CLINIC | Age: 60
End: 2020-12-23

## 2020-12-23 DIAGNOSIS — M79.5 FOREIGN BODY (FB) IN SOFT TISSUE: ICD-10-CM

## 2020-12-23 NOTE — TELEPHONE ENCOUNTER
"Spoke to patient.   \"I had an anyeursym on the end of my finger that I don't want to come back, I have had this infection for so long, that I don't want it to come back, so I wanted more antibiotic...\"    I asked if the area was painful, red, warm to the touch or if any foul odor was present, which he denied all.     \"It only bothers me when I try to sleep, I get some sharp pains once in a while, but it doesn't stink anymore and it is getting smaller. The first time, the antibiotic didn't do anything, so I tried it again, but I just want to be sure to get rid of this infection\"    I did advise sharp pain can be from nerve regeneration after excision and can be expected. It doesn't sound like any symptoms of infection are present at this time and since he has had 2 courses of 7 days of Doxycycline, I suspect the antibiotic has done it's job, but patient is asking that I check with Provider.     Please advise. Jamila Diaz RN                  "

## 2020-12-23 NOTE — TELEPHONE ENCOUNTER
Spouse states that she would like you to talk to any provider about refilling this medication today.  She would like a phone call again also today.

## 2020-12-23 NOTE — TELEPHONE ENCOUNTER
Requested Prescriptions   Pending Prescriptions Disp Refills     doxycycline monohydrate (MONODOX) 100 MG capsule 14 capsule 0     Sig: Take 1 capsule (100 mg) by mouth 2 times daily       There is no refill protocol information for this order        Last Written Prescription Date:    Last Fill Quantity: ,  # refills:    Last office visit: 12/15/2020 with prescribing provider:  Delores Monk Office Visit:

## 2020-12-23 NOTE — TELEPHONE ENCOUNTER
Left message for patient to call back and clarify if he requested the refill and if he did, then is he he still having symptoms of infection in his thumb.  Maryse DE LA TORRE RN BSN PHN  [facility]

## 2020-12-23 NOTE — TELEPHONE ENCOUNTER
Spouse Aisha calling back. She states that they did request this refill. Paul is still having issues with his finger. Nail is looking better, but the fingertip looks infected.

## 2020-12-24 RX ORDER — DOXYCYCLINE 100 MG/1
100 CAPSULE ORAL 2 TIMES DAILY
Qty: 14 CAPSULE | Refills: 0 | Status: SHIPPED | OUTPATIENT
Start: 2020-12-24

## 2020-12-24 NOTE — TELEPHONE ENCOUNTER
Yes it sound more like nerve related pain.   If it becomes red, hot, swollen, draining then I would be concerned about infection.  Can open mychart account and send photo so we can see how his finger is healing.   Can take tylenol or ibuprofen at bedtime to help with discomfort.    pain: pain:

## 2020-12-24 NOTE — TELEPHONE ENCOUNTER
"Spoke to patient/Spouse and advised of need for Consent to communicate.    I did advise Doxycyline was refilled by Dr. Estrella and did discuss antibiotic resistance, patient stated he is aware and \"has read about that\"/    I did review Vidhi Hoffmann PA-C dictation below with him as well and scheduled follow up appointment. Jamila Diaz RN    "

## 2021-01-12 ENCOUNTER — OFFICE VISIT (OUTPATIENT)
Dept: DERMATOLOGY | Facility: CLINIC | Age: 61
End: 2021-01-12
Payer: COMMERCIAL

## 2021-01-12 VITALS — SYSTOLIC BLOOD PRESSURE: 151 MMHG | OXYGEN SATURATION: 98 % | DIASTOLIC BLOOD PRESSURE: 88 MMHG | HEART RATE: 67 BPM

## 2021-01-12 DIAGNOSIS — M79.5 SOFT TISSUES FOREIGN BODY: Primary | ICD-10-CM

## 2021-01-12 PROCEDURE — 99212 OFFICE O/P EST SF 10 MIN: CPT | Performed by: DERMATOLOGY

## 2021-01-12 NOTE — PROGRESS NOTES
Paul Canseco is an extremely pleasant 60 year old year old male patient here today for f/u r thumg foreign body with wax.  LOV drained.  Much of the pain is improved he still notes some soft tissue swelling on distal thumb and some pain but overall better. Patient has no other skin complaints today.  Remainder of the HPI, Meds, PMH, Allergies, FH, and SH was reviewed in chart.    History reviewed. No pertinent past medical history.    Past Surgical History:   Procedure Laterality Date     COLONOSCOPY  2012    Procedure:COLONOSCOPY; Colonoscopy-Dr. Jaeger; Surgeon:SOTO TOM; Location:[facility]     VASECTOMY          Family History   Problem Relation Age of Onset     Breast Cancer Mother         late 50's; lives in [place]     Arthritis Mother      Breast Cancer Maternal Grandmother      Cerebrovascular Disease Paternal Grandfather         late 60's, ETOH     Thyroid Disease Sister        Social History     Socioeconomic History     Marital status:      Spouse name: Aisha Canseco     Number of children: 2     Years of education: 12+     Highest education level: Not on file   Occupational History     Occupation:      Employer: SELF EMPLOYED.   Social Needs     Financial resource strain: Not on file     Food insecurity     Worry: Not on file     Inability: Not on file     Transportation needs     Medical: Not on file     Non-medical: Not on file   Tobacco Use     Smoking status: Former Smoker     Quit date: 2006     Years since quittin.1     Smokeless tobacco: Never Used     Tobacco comment: cigar once a week   Substance and Sexual Activity     Alcohol use: Yes     Comment: 2 beers a day     Drug use: No     Sexual activity: Yes     Partners: Female     Birth control/protection: Surgical     Comment: Patient has had a vasectomy   Lifestyle     Physical activity     Days per week: Not on file     Minutes per session: Not on file     Stress: Not on file   Relationships     Social  connections     Talks on phone: Not on file     Gets together: Not on file     Attends Alevism service: Not on file     Active member of club or organization: Not on file     Attends meetings of clubs or organizations: Not on file     Relationship status: Not on file     Intimate partner violence     Fear of current or ex partner: Not on file     Emotionally abused: Not on file     Physically abused: Not on file     Forced sexual activity: Not on file   Other Topics Concern     Parent/sibling w/ CABG, MI or angioplasty before 65F 55M? No   Social History Narrative     Not on file       Outpatient Encounter Medications as of 1/12/2021   Medication Sig Dispense Refill     ASPIRIN 325 MG OR TBEC ONE DAILY 100 3     doxycycline monohydrate (MONODOX) 100 MG capsule Take 1 capsule (100 mg) by mouth 2 times daily 14 capsule 0     doxycycline monohydrate (MONODOX) 100 MG capsule Take 1 capsule (100 mg) by mouth 2 times daily (Patient not taking: Reported on 12/15/2020) 14 capsule 0     Omega-3 Fatty Acids (FISH OIL OMEGA-3 PO)        No facility-administered encounter medications on file as of 1/12/2021.              Review Of Systems  Skin: As above  Eyes: negative  Ears/Nose/Throat: negative  Respiratory: No shortness of breath, dyspnea on exertion, cough, or hemoptysis  Cardiovascular: negative  Gastrointestinal: negative  Genitourinary: negative  Musculoskeletal: negative  Neurologic: negative  Psychiatric: negative  Hematologic/Lymphatic/Immunologic: negative  Endocrine: negative      O:   NAD, WDWN, Alert & Oriented, Mood & Affect wnl, Vitals stable   Here today alone   BP (!) 151/88   Pulse 67   SpO2 98%    General appearance normal   Vitals stable   Alert, oriented and in no acute distress     R thumb well h ealed slight swelling in distal ventral thumb      Eyes: Conjunctivae/lids:Normal     ENT: Lips, buccal mucosa, tongue: normal    MSK:Normal    Cardiovascular: peripheral edema none    Pulm: Breathing  Normal    Neuro/Psych: Orientation:Alert and Orientedx3 ; Mood/Affect:normal       A/P:  1.. soft tissue foreign body  CT scan of thumb to rule out any more foreign body  await CT results

## 2021-01-12 NOTE — LETTER
2021         RE: Paul Canseco  [address] [facility] [address]        Dear Colleague,    Thank you for referring your patient, Paul Canseco, to the [facility]. Please see a copy of my visit note below.    Paul Canseco is an extremely pleasant 60 year old year old male patient here today for f/u r thumg foreign body with wax.  LOV drained.  Much of the pain is improved he still notes some soft tissue swelling on distal thumb and some pain but overall better. Patient has no other skin complaints today.  Remainder of the HPI, Meds, PMH, Allergies, FH, and SH was reviewed in chart.    History reviewed. No pertinent past medical history.    Past Surgical History:   Procedure Laterality Date     COLONOSCOPY  2012    Procedure:COLONOSCOPY; Colonoscopy-Dr. Jaeger; Surgeon:SOTO TOM; Location:[facility]     VASECTOMY          Family History   Problem Relation Age of Onset     Breast Cancer Mother         late 50's; lives in [place]     Arthritis Mother      Breast Cancer Maternal Grandmother      Cerebrovascular Disease Paternal Grandfather         late 60's, ETOH     Thyroid Disease Sister        Social History     Socioeconomic History     Marital status:      Spouse name: Aisha Canseco     Number of children: 2     Years of education: 12+     Highest education level: Not on file   Occupational History     Occupation:      Employer: SELF EMPLOYED.   Social Needs     Financial resource strain: Not on file     Food insecurity     Worry: Not on file     Inability: Not on file     Transportation needs     Medical: Not on file     Non-medical: Not on file   Tobacco Use     Smoking status: Former Smoker     Quit date: 2006     Years since quittin.1     Smokeless tobacco: Never Used     Tobacco comment: cigar once a week   Substance and Sexual Activity     Alcohol use: Yes     Comment: 2 beers a day     Drug use: No     Sexual activity: Yes      Partners: Female     Birth control/protection: Surgical     Comment: Patient has had a vasectomy   Lifestyle     Physical activity     Days per week: Not on file     Minutes per session: Not on file     Stress: Not on file   Relationships     Social connections     Talks on phone: Not on file     Gets together: Not on file     Attends Muslim service: Not on file     Active member of club or organization: Not on file     Attends meetings of clubs or organizations: Not on file     Relationship status: Not on file     Intimate partner violence     Fear of current or ex partner: Not on file     Emotionally abused: Not on file     Physically abused: Not on file     Forced sexual activity: Not on file   Other Topics Concern     Parent/sibling w/ CABG, MI or angioplasty before 65F 55M? No   Social History Narrative     Not on file       Outpatient Encounter Medications as of 1/12/2021   Medication Sig Dispense Refill     ASPIRIN 325 MG OR TBEC ONE DAILY 100 3     doxycycline monohydrate (MONODOX) 100 MG capsule Take 1 capsule (100 mg) by mouth 2 times daily 14 capsule 0     doxycycline monohydrate (MONODOX) 100 MG capsule Take 1 capsule (100 mg) by mouth 2 times daily (Patient not taking: Reported on 12/15/2020) 14 capsule 0     Omega-3 Fatty Acids (FISH OIL OMEGA-3 PO)        No facility-administered encounter medications on file as of 1/12/2021.              Review Of Systems  Skin: As above  Eyes: negative  Ears/Nose/Throat: negative  Respiratory: No shortness of breath, dyspnea on exertion, cough, or hemoptysis  Cardiovascular: negative  Gastrointestinal: negative  Genitourinary: negative  Musculoskeletal: negative  Neurologic: negative  Psychiatric: negative  Hematologic/Lymphatic/Immunologic: negative  Endocrine: negative      O:   NAD, WDWN, Alert & Oriented, Mood & Affect wnl, Vitals stable   Here today alone   BP (!) 151/88   Pulse 67   SpO2 98%    General appearance normal   Vitals stable   Alert, oriented  and in no acute distress     R thumb well h ealed slight swelling in distal ventral thumb      Eyes: Conjunctivae/lids:Normal     ENT: Lips, buccal mucosa, tongue: normal    MSK:Normal    Cardiovascular: peripheral edema none    Pulm: Breathing Normal    Neuro/Psych: Orientation:Alert and Orientedx3 ; Mood/Affect:normal       A/P:  1.. soft tissue foreign body  CT scan of thumb to rule out any more foreign body  await CT results         Again, thank you for allowing me to participate in the care of your patient.        Sincerely,        Juan Diego Estrella MD

## 2021-01-19 ENCOUNTER — HOSPITAL ENCOUNTER (OUTPATIENT)
Dept: CT IMAGING | Facility: CLINIC | Age: 61
Discharge: HOME | End: 2021-01-19
Attending: DERMATOLOGY | Admitting: DERMATOLOGY
Payer: COMMERCIAL

## 2021-01-19 DIAGNOSIS — M79.5 SOFT TISSUES FOREIGN BODY: ICD-10-CM

## 2021-01-19 PROCEDURE — 73200 CT UPPER EXTREMITY W/O DYE: CPT | Mod: RT

## 2021-01-20 ENCOUNTER — OFFICE VISIT (OUTPATIENT)
Dept: DERMATOLOGY | Facility: CLINIC | Age: 61
End: 2021-01-20
Payer: COMMERCIAL

## 2021-01-20 ENCOUNTER — TELEPHONE (OUTPATIENT)
Dept: DERMATOLOGY | Facility: CLINIC | Age: 61
End: 2021-01-20

## 2021-01-20 DIAGNOSIS — T14.8XXA FOREIGN BODY IN SKIN: Primary | ICD-10-CM

## 2021-01-20 DIAGNOSIS — M79.5 FOREIGN BODY (FB) IN SOFT TISSUE: Primary | ICD-10-CM

## 2021-01-20 PROCEDURE — 99213 OFFICE O/P EST LOW 20 MIN: CPT | Performed by: DERMATOLOGY

## 2021-01-20 PROCEDURE — 87070 CULTURE OTHR SPECIMN AEROBIC: CPT | Performed by: DERMATOLOGY

## 2021-01-20 RX ORDER — DOXYCYCLINE 100 MG/1
100 CAPSULE ORAL 2 TIMES DAILY
Qty: 14 CAPSULE | Refills: 0 | Status: SHIPPED | OUTPATIENT
Start: 2021-01-20

## 2021-01-20 NOTE — CONFIDENTIAL NOTE
Pt's wife called. No consent to communicate. She stated that she would like the patient referred out as she is tired of paying office visits for patient and nothing is being resolved. She does not want patient to come to clinic today and have wound culture and would just like for pt to referred out. She reported that this has cost them enough money already. Spoke to provider and there is no charge for the office visit today and referral placed to [facility]. Pt notified.   Maryse DE LA TORRE RN BSN PHN  Specialty Clinics

## 2021-01-20 NOTE — PROGRESS NOTES
Paul Canseco is an extremely pleasant 60 year old year old male patient here today for tender ness on right thumb.  He notes wax still coming out.  CT scan negative.     No past medical history on file.    Past Surgical History:   Procedure Laterality Date     COLONOSCOPY  2012    Procedure:COLONOSCOPY; Colonoscopy-Dr. Jaeger; Surgeon:SOTO TOM; Location:[facility]     VASECTOMY          Family History   Problem Relation Age of Onset     Breast Cancer Mother         late 50's; lives in [place]     Arthritis Mother      Breast Cancer Maternal Grandmother      Cerebrovascular Disease Paternal Grandfather         late 60's, ETOH     Thyroid Disease Sister        Social History     Socioeconomic History     Marital status:      Spouse name: Aisha Canseco     Number of children: 2     Years of education: 12+     Highest education level: Not on file   Occupational History     Occupation:      Employer: SELF EMPLOYED.   Social Needs     Financial resource strain: Not on file     Food insecurity     Worry: Not on file     Inability: Not on file     Transportation needs     Medical: Not on file     Non-medical: Not on file   Tobacco Use     Smoking status: Former Smoker     Quit date: 2006     Years since quittin.1     Smokeless tobacco: Never Used     Tobacco comment: cigar once a week   Substance and Sexual Activity     Alcohol use: Yes     Comment: 2 beers a day     Drug use: No     Sexual activity: Yes     Partners: Female     Birth control/protection: Surgical     Comment: Patient has had a vasectomy   Lifestyle     Physical activity     Days per week: Not on file     Minutes per session: Not on file     Stress: Not on file   Relationships     Social connections     Talks on phone: Not on file     Gets together: Not on file     Attends Moravian service: Not on file     Active member of club or organization: Not on file     Attends meetings of clubs or organizations: Not on file      Relationship status: Not on file     Intimate partner violence     Fear of current or ex partner: Not on file     Emotionally abused: Not on file     Physically abused: Not on file     Forced sexual activity: Not on file   Other Topics Concern     Parent/sibling w/ CABG, MI or angioplasty before 65F 55M? No   Social History Narrative     Not on file       Outpatient Encounter Medications as of 1/20/2021   Medication Sig Dispense Refill     ASPIRIN 325 MG OR TBEC ONE DAILY 100 3     doxycycline monohydrate (MONODOX) 100 MG capsule Take 1 capsule (100 mg) by mouth 2 times daily 14 capsule 0     doxycycline monohydrate (MONODOX) 100 MG capsule Take 1 capsule (100 mg) by mouth 2 times daily 14 capsule 0     doxycycline monohydrate (MONODOX) 100 MG capsule Take 1 capsule (100 mg) by mouth 2 times daily (Patient not taking: Reported on 12/15/2020) 14 capsule 0     Omega-3 Fatty Acids (FISH OIL OMEGA-3 PO)        No facility-administered encounter medications on file as of 1/20/2021.              Review Of Systems  Skin: As above  Eyes: negative  Ears/Nose/Throat: negative  Respiratory: No shortness of breath, dyspnea on exertion, cough, or hemoptysis  Cardiovascular: negative  Gastrointestinal: negative  Genitourinary: negative  Musculoskeletal: negative  Neurologic: negative  Psychiatric: negative  Hematologic/Lymphatic/Immunologic: negative  Endocrine: negative      O:   NAD, WDWN, Alert & Oriented, Mood & Affect wnl, Vitals stable   Here today alone   There were no vitals taken for this visit.   General appearance normal   Vitals stable   Alert, oriented and in no acute distress     Swollen right thumb    A/P:  1. Foreign bdoy  Wound culture today  Doxy twice daily  Referral to ortho

## 2021-01-20 NOTE — LETTER
2021         RE: Paul Canseco  [address] [facility] [address]        Dear Colleague,    Thank you for referring your patient, Paul Canseco, to the [facility]. Please see a copy of my visit note below.    Paul Canseco is an extremely pleasant 60 year old year old male patient here today for tender ness on right thumb.  He notes wax still coming out.  CT scan negative.     No past medical history on file.    Past Surgical History:   Procedure Laterality Date     COLONOSCOPY  2012    Procedure:COLONOSCOPY; Colonoscopy-Dr. Jaeger; Surgeon:SOTO TOM; Location:[facility]     VASECTOMY          Family History   Problem Relation Age of Onset     Breast Cancer Mother         late 50's; lives in [place]     Arthritis Mother      Breast Cancer Maternal Grandmother      Cerebrovascular Disease Paternal Grandfather         late 60's, ETOH     Thyroid Disease Sister        Social History     Socioeconomic History     Marital status:      Spouse name: Aisha Canseco     Number of children: 2     Years of education: 12+     Highest education level: Not on file   Occupational History     Occupation:      Employer: SELF EMPLOYED.   Social Needs     Financial resource strain: Not on file     Food insecurity     Worry: Not on file     Inability: Not on file     Transportation needs     Medical: Not on file     Non-medical: Not on file   Tobacco Use     Smoking status: Former Smoker     Quit date: 2006     Years since quittin.1     Smokeless tobacco: Never Used     Tobacco comment: cigar once a week   Substance and Sexual Activity     Alcohol use: Yes     Comment: 2 beers a day     Drug use: No     Sexual activity: Yes     Partners: Female     Birth control/protection: Surgical     Comment: Patient has had a vasectomy   Lifestyle     Physical activity     Days per week: Not on file     Minutes per session: Not on file     Stress: Not on file   Relationships      Social connections     Talks on phone: Not on file     Gets together: Not on file     Attends Quaker service: Not on file     Active member of club or organization: Not on file     Attends meetings of clubs or organizations: Not on file     Relationship status: Not on file     Intimate partner violence     Fear of current or ex partner: Not on file     Emotionally abused: Not on file     Physically abused: Not on file     Forced sexual activity: Not on file   Other Topics Concern     Parent/sibling w/ CABG, MI or angioplasty before 65F 55M? No   Social History Narrative     Not on file       Outpatient Encounter Medications as of 1/20/2021   Medication Sig Dispense Refill     ASPIRIN 325 MG OR TBEC ONE DAILY 100 3     doxycycline monohydrate (MONODOX) 100 MG capsule Take 1 capsule (100 mg) by mouth 2 times daily 14 capsule 0     doxycycline monohydrate (MONODOX) 100 MG capsule Take 1 capsule (100 mg) by mouth 2 times daily 14 capsule 0     doxycycline monohydrate (MONODOX) 100 MG capsule Take 1 capsule (100 mg) by mouth 2 times daily (Patient not taking: Reported on 12/15/2020) 14 capsule 0     Omega-3 Fatty Acids (FISH OIL OMEGA-3 PO)        No facility-administered encounter medications on file as of 1/20/2021.              Review Of Systems  Skin: As above  Eyes: negative  Ears/Nose/Throat: negative  Respiratory: No shortness of breath, dyspnea on exertion, cough, or hemoptysis  Cardiovascular: negative  Gastrointestinal: negative  Genitourinary: negative  Musculoskeletal: negative  Neurologic: negative  Psychiatric: negative  Hematologic/Lymphatic/Immunologic: negative  Endocrine: negative      O:   NAD, WDWN, Alert & Oriented, Mood & Affect wnl, Vitals stable   Here today alone   There were no vitals taken for this visit.   General appearance normal   Vitals stable   Alert, oriented and in no acute distress     Swollen right thumb    A/P:  1. Foreign bdoy  Wound culture today  Doxy twice daily  Referral  to ortho        Again, thank you for allowing me to participate in the care of your patient.        Sincerely,        Juan Diego Estrella MD

## 2021-01-22 LAB
BACTERIA SPEC CULT: NORMAL
Lab: NORMAL
SPECIMEN SOURCE: NORMAL

## 2021-01-28 NOTE — CONFIDENTIAL NOTE
"Pt wife calling back stating she received a bill for $500 for the procedure done on 1/20/2021. She states nothing was supposed to be done when he came in and he was not going to be changed for the visit. She wanted the pt to be referred out. She states a CT was done as well and would like to know why this was done knowing wax is the object in the finger and it is \"clear\" and would not be able to be seen on a CT?. Pt wife states they will not be paying for any of theses bills as nothing has been done to help his finger and it is still infected. She wants a f/u phone call    Please call    Genesis Kamara  Specialty CSS    "

## 2021-02-01 NOTE — TELEPHONE ENCOUNTER
Nothing was done on the 20th except for a wound culture and a referral to [facility].     A level 1 office visit was billed

## 2021-02-08 NOTE — TELEPHONE ENCOUNTER
Pt's wife, Aisha, is calling requesting a call back from the provider. States pt has to have emergency surgery due to infection in his finger and bone deterioration. She says they will be filing a lawsuit.     Contact Aisha @ [phone]    Caller was transferred to CULLEN Schmidt, Pt Care Supervisor    Denise Behrendt Specialty CSS

## 2021-02-09 ENCOUNTER — TELEPHONE (OUTPATIENT)
Dept: DERMATOLOGY | Facility: CLINIC | Age: 61
End: 2021-02-09

## 2021-02-09 NOTE — TELEPHONE ENCOUNTER
Reason for Call:  Other call back    Detailed comments: Pt's wife calling extremely upset- states provider misdiagnosed his finger - he is going in for an immediate surgery because of this.  States she has been going back and forth with Dr. Estrella's staff in dermatology.  Bone mass loss because scan was not read correctly.  Is very upset and wants to speak to someone right now.  Transferred pt to saumya - Barb GUERRA but will also send to clinic RN.    Phone Number Patient can be reached at: Home number on file [phone] (home) Aisha Rivas Time:     Can we leave a detailed message on this number? YES    Call taken on 2/9/2021 at 2:01 PM by Casi Davalos

## 2021-02-11 NOTE — TELEPHONE ENCOUNTER
Spouse Aisha calling back to check the status of this request. Pt and spouse are still very upset with this situation and would like to speak with the Dermatology care team directly as soon as possible. They are becoming impatient. Please contact Aisha at [phone].    Pt relations phone number was also provided to express their further concerns.    Magdalena Alcazar on 2/11/2021 at 11:44 AM

## 2021-03-15 ENCOUNTER — RECORDS - HEALTHEAST (OUTPATIENT)
Dept: LAB | Facility: CLINIC | Age: 61
End: 2021-03-15

## 2021-03-15 LAB
ALBUMIN SERPL-MCNC: 3.9 G/DL (ref 3.5–5)
ALP SERPL-CCNC: 61 U/L (ref 45–120)
ALT SERPL W P-5'-P-CCNC: 15 U/L (ref 0–45)
ANION GAP SERPL CALCULATED.3IONS-SCNC: 10 MMOL/L (ref 5–18)
AST SERPL W P-5'-P-CCNC: 18 U/L (ref 0–40)
BILIRUB SERPL-MCNC: 0.5 MG/DL (ref 0–1)
BUN SERPL-MCNC: 15 MG/DL (ref 8–22)
CALCIUM SERPL-MCNC: 8.9 MG/DL (ref 8.5–10.5)
CHLORIDE BLD-SCNC: 107 MMOL/L (ref 98–107)
CO2 SERPL-SCNC: 25 MMOL/L (ref 22–31)
CREAT SERPL-MCNC: 0.99 MG/DL (ref 0.7–1.3)
ERYTHROCYTE [DISTWIDTH] IN BLOOD BY AUTOMATED COUNT: 13.2 % (ref 11–14.5)
GFR SERPL CREATININE-BSD FRML MDRD: >60 ML/MIN/1.73M2
GLUCOSE BLD-MCNC: 96 MG/DL (ref 70–125)
HCT VFR BLD AUTO: 43.5 % (ref 40–54)
HGB BLD-MCNC: 14.7 G/DL (ref 14–18)
MCH RBC QN AUTO: 31.5 PG (ref 27–34)
MCHC RBC AUTO-ENTMCNC: 33.8 G/DL (ref 32–36)
MCV RBC AUTO: 93 FL (ref 80–100)
PLATELET # BLD AUTO: 186 THOU/UL (ref 140–440)
PMV BLD AUTO: 11.2 FL (ref 8.5–12.5)
POTASSIUM BLD-SCNC: 5.5 MMOL/L (ref 3.5–5)
PROT SERPL-MCNC: 6.6 G/DL (ref 6–8)
RBC # BLD AUTO: 4.66 MILL/UL (ref 4.4–6.2)
SODIUM SERPL-SCNC: 142 MMOL/L (ref 136–145)
TSH SERPL DL<=0.005 MIU/L-ACNC: 1.16 UIU/ML (ref 0.3–5)
WBC: 9.8 THOU/UL (ref 4–11)

## 2021-04-03 ENCOUNTER — HEALTH MAINTENANCE LETTER (OUTPATIENT)
Age: 61
End: 2021-04-03

## 2021-08-19 ENCOUNTER — LAB REQUISITION (OUTPATIENT)
Dept: LAB | Facility: CLINIC | Age: 61
End: 2021-08-19

## 2021-08-19 DIAGNOSIS — I10 ESSENTIAL (PRIMARY) HYPERTENSION: ICD-10-CM

## 2021-08-19 DIAGNOSIS — E78.2 MIXED HYPERLIPIDEMIA: ICD-10-CM

## 2021-08-19 DIAGNOSIS — Z12.5 ENCOUNTER FOR SCREENING FOR MALIGNANT NEOPLASM OF PROSTATE: ICD-10-CM

## 2021-08-19 LAB
ANION GAP SERPL CALCULATED.3IONS-SCNC: 10 MMOL/L (ref 5–18)
BUN SERPL-MCNC: 14 MG/DL (ref 8–22)
CALCIUM SERPL-MCNC: 9.8 MG/DL (ref 8.5–10.5)
CHLORIDE BLD-SCNC: 107 MMOL/L (ref 98–107)
CHOLEST SERPL-MCNC: 186 MG/DL
CO2 SERPL-SCNC: 24 MMOL/L (ref 22–31)
CREAT SERPL-MCNC: 1.18 MG/DL (ref 0.7–1.3)
FASTING STATUS PATIENT QL REPORTED: NORMAL
GFR SERPL CREATININE-BSD FRML MDRD: 67 ML/MIN/1.73M2
GLUCOSE BLD-MCNC: 102 MG/DL (ref 70–125)
HDLC SERPL-MCNC: 66 MG/DL
LDLC SERPL CALC-MCNC: 107 MG/DL
POTASSIUM BLD-SCNC: 5.3 MMOL/L (ref 3.5–5)
PSA SERPL-MCNC: 1.51 UG/L (ref 0–3.5)
SODIUM SERPL-SCNC: 141 MMOL/L (ref 136–145)
TRIGL SERPL-MCNC: 63 MG/DL

## 2021-08-19 PROCEDURE — 80048 BASIC METABOLIC PNL TOTAL CA: CPT | Performed by: PHYSICIAN ASSISTANT

## 2021-08-19 PROCEDURE — G0103 PSA SCREENING: HCPCS | Performed by: PHYSICIAN ASSISTANT

## 2021-08-19 PROCEDURE — 80061 LIPID PANEL: CPT | Performed by: PHYSICIAN ASSISTANT

## 2021-11-13 ENCOUNTER — HEALTH MAINTENANCE LETTER (OUTPATIENT)
Age: 61
End: 2021-11-13

## 2022-04-24 ENCOUNTER — HEALTH MAINTENANCE LETTER (OUTPATIENT)
Age: 62
End: 2022-04-24

## 2022-08-25 ENCOUNTER — LAB REQUISITION (OUTPATIENT)
Dept: LAB | Facility: CLINIC | Age: 62
End: 2022-08-25

## 2022-08-25 DIAGNOSIS — R63.4 ABNORMAL WEIGHT LOSS: ICD-10-CM

## 2022-08-25 DIAGNOSIS — E78.2 MIXED HYPERLIPIDEMIA: ICD-10-CM

## 2022-08-25 DIAGNOSIS — I10 ESSENTIAL (PRIMARY) HYPERTENSION: ICD-10-CM

## 2022-08-25 DIAGNOSIS — Z12.5 ENCOUNTER FOR SCREENING FOR MALIGNANT NEOPLASM OF PROSTATE: ICD-10-CM

## 2022-08-25 LAB
ALBUMIN SERPL BCG-MCNC: 4.8 G/DL (ref 3.5–5.2)
ALP SERPL-CCNC: 62 U/L (ref 40–129)
ALT SERPL W P-5'-P-CCNC: 16 U/L (ref 10–50)
ANION GAP SERPL CALCULATED.3IONS-SCNC: 8 MMOL/L (ref 7–15)
AST SERPL W P-5'-P-CCNC: 23 U/L (ref 10–50)
BILIRUB SERPL-MCNC: 0.5 MG/DL
BUN SERPL-MCNC: 17.5 MG/DL (ref 8–23)
CALCIUM SERPL-MCNC: 9.7 MG/DL (ref 8.8–10.2)
CHLORIDE SERPL-SCNC: 103 MMOL/L (ref 98–107)
CHOLEST SERPL-MCNC: 199 MG/DL
CREAT SERPL-MCNC: 0.94 MG/DL (ref 0.67–1.17)
CRP SERPL-MCNC: <3 MG/L
DEPRECATED HCO3 PLAS-SCNC: 27 MMOL/L (ref 22–29)
ERYTHROCYTE [SEDIMENTATION RATE] IN BLOOD BY WESTERGREN METHOD: 4 MM/HR (ref 0–20)
GFR SERPL CREATININE-BSD FRML MDRD: >90 ML/MIN/1.73M2
GLUCOSE SERPL-MCNC: 107 MG/DL (ref 70–99)
HDLC SERPL-MCNC: 77 MG/DL
LDLC SERPL CALC-MCNC: 109 MG/DL
NONHDLC SERPL-MCNC: 122 MG/DL
POTASSIUM SERPL-SCNC: 5.5 MMOL/L (ref 3.4–5.3)
PROT SERPL-MCNC: 7.1 G/DL (ref 6.4–8.3)
PSA SERPL-MCNC: 1.65 NG/ML (ref 0–4.5)
SODIUM SERPL-SCNC: 138 MMOL/L (ref 136–145)
TRIGL SERPL-MCNC: 67 MG/DL

## 2022-08-25 PROCEDURE — 80061 LIPID PANEL: CPT | Performed by: FAMILY MEDICINE

## 2022-08-25 PROCEDURE — G0103 PSA SCREENING: HCPCS | Performed by: FAMILY MEDICINE

## 2022-08-25 PROCEDURE — 86140 C-REACTIVE PROTEIN: CPT | Performed by: FAMILY MEDICINE

## 2022-08-25 PROCEDURE — 80053 COMPREHEN METABOLIC PANEL: CPT | Performed by: FAMILY MEDICINE

## 2022-08-25 PROCEDURE — 85652 RBC SED RATE AUTOMATED: CPT | Performed by: FAMILY MEDICINE

## 2022-08-30 ENCOUNTER — TELEPHONE (OUTPATIENT)
Dept: FAMILY MEDICINE | Facility: CLINIC | Age: 62
End: 2022-08-30

## 2022-08-30 NOTE — TELEPHONE ENCOUNTER
Patient Quality Outreach    Patient is due for the following:   Colon Cancer Screening  Physical Preventive Adult Physical    Next Steps:   Schedule a Annual Wellness Visit    Type of outreach:    Sent Videolicious message.      Questions for provider review:    None     Shea Estes, [facility]

## 2022-08-31 ENCOUNTER — TRANSCRIBE ORDERS (OUTPATIENT)
Dept: OTHER | Age: 62
End: 2022-08-31

## 2022-08-31 DIAGNOSIS — Z12.11 COLON CANCER SCREENING: Primary | ICD-10-CM

## 2022-11-02 ENCOUNTER — TELEPHONE (OUTPATIENT)
Dept: RADIOLOGY | Facility: CLINIC | Age: 62
End: 2022-11-02

## 2022-11-02 NOTE — TELEPHONE ENCOUNTER
Patient wife had to go take another call.    Will wait for her to call back to answer questions.  Patient is scheduled 1-25-23 12:30-1:30 [place]    Screening Questions  BLUE  KIND OF PREP RED  LOCATION [review exclusion criteria] GREEN  SEDATION TYPE        No Are you active on mychart?       Tamika Jaeger Ordering/Referring Provider?        [employer] What type of coverage do you have?           Have you had a positive covid test in the last 90 days?     No 1. BMI  [BMI 40+ - review exclusion criteria]    Y  2. Are you able to give consent for your medical care? [IF NO,RN REVIEW]        N  3. Are you taking any prescription pain medications on a routine schedule?      N  3a. EXTENDED PREP What kind of prescription?     N 4. Do you have any chemical dependencies such as alcohol, street drugs, or methadone?    N 5. Do you have any history of post-traumatic stress syndrome, severe anxiety or history of psychosis?      **If yes 3- 5 , please schedule with MAC sedation.**          IF YES TO ANY 6 - 10 - HOSPITAL SETTING ONLY.     N 6.   Do you need assistance transferring?     N 7.   Have you had a heart or lung transplant?    N 8.   Are you currently on dialysis?   N 9.   Do you use daily home oxygen?   N 10. Do you take nitroglycerin?   10a. N If yes, how often?     11. [FEMALES]  N Are you currently pregnant?    11a. N If yes, how many weeks? [ Greater than 12 weeks, OR NEEDED]    N 12. Do you have Pulmonary Hypertension? *NEED PAC APPT AT [facility]*     N 13. [review exclusion criteria]  Do you have any implantable devices in your body (pacemaker, defib, LVAD)?    N 14. In the past 6 months, have you had any heart related issues including cardiomyopathy or heart attack?     14a. N If yes, did it require cardiac stenting if so when?     N 15. Have you had a stroke or Transient ischemic attack (TIA - aka  mini stroke ) within 6 months?      N 16. Do you have mod to severe Obstructive Sleep Apnea?  [Hospital only - Ok at [facility]]    N 17. Do you have SEVERE AND UNCONTROLLED asthma? *NEED PAC APPT AT [facility]*     N 18. Are you currently taking any blood thinners?     18a. If yes, inform patient to \"follow up w/ ordering provider for bridging instructions.\"    N 19. Do you take the medication Phentermine?    19a. If yes, \"Hold for 7 days before procedure.  Please consult your prescribing provider if you have questions about holding this medication.\"     N  20. Do you have chronic kidney disease?      N  21. Do you have a diagnosis of diabetes?     N  22. On a regular basis do you go 3-5 days between bowel movements?     See below 23. Preferred LOCAL Pharmacy for Pre Prescription    [ LIST ONLY ONE PHARMACY]        [facility] - [place], [place] - [address] [facility]      - CLOSING REMINDERS -    Informed patient they will need an adult    Cannot take any type of public or medical transportation alone    Conscious Sedation- Needs  for 6 hours after the procedure       MAC/General-Needs  for 24 hours after procedure    Pre-Procedure Covid test to be completed [[facility] PCR Testing Required]    Confirmed Nurse will call to complete assessment       - SCHEDULING DETAILS -     N  Surgeon    N  Date of Procedure  Lower Endoscopy [Colonoscopy]  Type of Procedure Scheduled   [facility]-If you answer yes to questions #8, #20, #21Which Colonoscopy Prep was Sent?     GEN Sedation Type     N PAC / Pre-op Required         Additional comments:            "

## 2023-01-19 RX ORDER — BISACODYL 5 MG
TABLET, DELAYED RELEASE (ENTERIC COATED) ORAL
Qty: 4 TABLET | Refills: 0 | Status: SHIPPED | OUTPATIENT
Start: 2023-01-19

## 2023-05-01 RX ORDER — BISACODYL 5 MG/1
TABLET, DELAYED RELEASE ORAL
Qty: 4 TABLET | Refills: 0 | Status: SHIPPED | OUTPATIENT
Start: 2023-05-01

## 2023-05-03 ENCOUNTER — TELEPHONE (OUTPATIENT)
Dept: SURGERY | Facility: CLINIC | Age: 63
End: 2023-05-03

## 2023-06-01 ENCOUNTER — HEALTH MAINTENANCE LETTER (OUTPATIENT)
Age: 63
End: 2023-06-01

## 2023-08-08 RX ORDER — BISACODYL 5 MG/1
TABLET, DELAYED RELEASE ORAL
Qty: 4 TABLET | Refills: 0 | Status: SHIPPED | OUTPATIENT
Start: 2023-08-08

## 2023-08-11 ENCOUNTER — ANESTHESIA EVENT (OUTPATIENT)
Dept: GASTROENTEROLOGY | Facility: CLINIC | Age: 63
End: 2023-08-11
Payer: COMMERCIAL

## 2023-08-11 ASSESSMENT — LIFESTYLE VARIABLES: TOBACCO_USE: 1

## 2023-08-11 NOTE — ANESTHESIA PREPROCEDURE EVALUATION
Anesthesia Pre-Procedure Evaluation    Patient: Paul Canseco   MRN: [id number] : 1960        Procedure : Procedure(s):  COLONOSCOPY          No past medical history on file.   Past Surgical History:   Procedure Laterality Date    COLONOSCOPY  2012    Procedure:COLONOSCOPY; Colonoscopy-Dr. Jaeger; Surgeon:SOTO TOM; Location:[facility]    VASECTOMY        Allergies   Allergen Reactions    Pcn [Penicillins] Other (See Comments)     Passed out as child      Social History     Tobacco Use    Smoking status: Former     Types: Cigarettes     Quit date: 2006     Years since quittin.7    Smokeless tobacco: Never    Tobacco comments:     cigar once a week   Substance Use Topics    Alcohol use: Yes     Comment: 2 beers a day      Wt Readings from Last 1 Encounters:   20 67.6 kg (149 lb)        Anesthesia Evaluation   Pt has had prior anesthetic.         ROS/MED HX  ENT/Pulmonary:     (+)                tobacco use, Past use,                      Neurologic:  - neg neurologic ROS     Cardiovascular:     (+) Dyslipidemia hypertension- -   -  - -                                 Previous cardiac testing   Echo: Date: 3/2007 Results:  neurysm of the interatrial septum Intact interatrial septum- no patent   foramen ovale Mild atherosclerosis of the aortic arch No intracavitary masses   or thrombi No valvular vegetations Normal LV systolic contraction Normal   function of the cardiac valves   Contrast study was performed both at rest and again during Valsalva maneuver.   No intracardiac shunt was detected.   HeartRate: 69 bpm      Left Ventricle   The left ventricle is normal in size.   There is normal left ventricular wall thickness.   The left ventricular ejection fraction is normal.   Normal left ventricular wall motion.   There is no thrombus.   No evidence of left ventricular mass or tumors.      Right Ventricle   Normal right ventricle structure and size.      Atria   Normal left atrial  size.   No left atrial mass or thrombus visualized.   Intact atrial septum.   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      Mitral Valve   The mitral valve is normal in structure and function.   There is no evidence of mitral valve prolapse.   There is no vegetation seen on the mitral valve.      Tricuspid Valve   The tricuspid valve is normal in structure and function.   There is trace tricuspid regurgitation.      Aortic Valve   The aortic valve is normal in structure and function.   The aortic valve is trileaflet.   No evidence of vegetation.   No aortic regurgitation is present.      Pulmonic Valve   The pulmonic valve is normal in structure and function.      Vessels   The aortic root is normal size.   Descending aortic velocity normal.   Mild atherosclerotic plaque(s) in the descending aorta.      Pericardial/Pleural   There is no pericardial effusion.         Stress Test:  Date: Results:    ECG Reviewed:  Date: Results:    Cath:  Date: Results:      METS/Exercise Tolerance:     Hematologic:  - neg hematologic  ROS     Musculoskeletal:  - neg musculoskeletal ROS     GI/Hepatic:  - neg GI/hepatic ROS     Renal/Genitourinary:  - neg Renal ROS     Endo:  - neg endo ROS     Psychiatric/Substance Use:  - neg psychiatric ROS     Infectious Disease:  - neg infectious disease ROS     Malignancy:  - neg malignancy ROS     Other:  - neg other ROS          Physical Exam    Airway  airway exam normal      Mallampati: I   TM distance: > 3 FB   Neck ROM: full   Mouth opening: > 3 cm    Respiratory Devices and Support         Dental       (+) Minor Abnormalities - some fillings, tiny chips      Cardiovascular   cardiovascular exam normal          Pulmonary   pulmonary exam normal                OUTSIDE LABS:  CBC:   Lab Results   Component Value Date    WBC 9.8 03/15/2021    WBC 8.3 08/01/2013    HGB 14.7 03/15/2021    HGB 14.9 08/01/2013    HCT 43.5 03/15/2021    HCT 43.2 08/01/2013     03/15/2021     08/01/2013      BMP:   Lab Results   Component Value Date     08/25/2022     08/19/2021    POTASSIUM 5.5 (H) 08/25/2022    POTASSIUM 5.3 (H) 08/19/2021    CHLORIDE 103 08/25/2022    CHLORIDE 107 08/19/2021    CO2 27 08/25/2022    CO2 24 08/19/2021    BUN 17.5 08/25/2022    BUN 14 08/19/2021    CR 0.94 08/25/2022    CR 1.18 08/19/2021     (H) 08/25/2022     08/19/2021     COAGS:   Lab Results   Component Value Date    PTT 28 03/26/2007    INR 0.98 03/26/2007     POC: No results found for: BGM, HCG, HCGS  HEPATIC:   Lab Results   Component Value Date    ALBUMIN 4.8 08/25/2022    PROTTOTAL 7.1 08/25/2022    ALT 16 08/25/2022    AST 23 08/25/2022    ALKPHOS 62 08/25/2022    BILITOTAL 0.5 08/25/2022     OTHER:   Lab Results   Component Value Date    RAMON 9.7 08/25/2022    TSH 1.16 03/15/2021    T4 1.24 08/01/2013    CRP <5.0 08/01/2013    SED 4 08/25/2022       Anesthesia Plan    ASA Status:  2       Anesthesia Type: General.              Consents    Anesthesia Plan(s) and associated risks, benefits, and realistic alternatives discussed. Questions answered and patient/representative(s) expressed understanding.     - Discussed:     - Discussed with:  Patient            Postoperative Care       PONV prophylaxis: Background Propofol Infusion     Comments:                LIZZETTE Kenny CRNA

## 2023-08-15 ENCOUNTER — ANESTHESIA (OUTPATIENT)
Dept: GASTROENTEROLOGY | Facility: CLINIC | Age: 63
End: 2023-08-15
Payer: COMMERCIAL

## 2023-08-15 ENCOUNTER — HOSPITAL ENCOUNTER (OUTPATIENT)
Facility: CLINIC | Age: 63
Discharge: HOME | End: 2023-08-15
Attending: SURGERY | Admitting: SURGERY
Payer: COMMERCIAL

## 2023-08-15 VITALS
HEART RATE: 50 BPM | DIASTOLIC BLOOD PRESSURE: 79 MMHG | TEMPERATURE: 98.2 F | BODY MASS INDEX: 22.58 KG/M2 | WEIGHT: 149 LBS | SYSTOLIC BLOOD PRESSURE: 145 MMHG | HEIGHT: 68 IN | OXYGEN SATURATION: 98 % | RESPIRATION RATE: 16 BRPM

## 2023-08-15 DIAGNOSIS — Z12.11 SPECIAL SCREENING FOR MALIGNANT NEOPLASMS, COLON: Primary | ICD-10-CM

## 2023-08-15 DIAGNOSIS — Z13.6 SCREENING FOR HYPERTENSION: ICD-10-CM

## 2023-08-15 LAB — COLONOSCOPY: NORMAL

## 2023-08-15 PROCEDURE — 250N000009 HC RX 250: Performed by: SURGERY

## 2023-08-15 PROCEDURE — G0121 COLON CA SCRN NOT HI RSK IND: HCPCS | Performed by: SURGERY

## 2023-08-15 PROCEDURE — 45378 DIAGNOSTIC COLONOSCOPY: CPT | Performed by: SURGERY

## 2023-08-15 PROCEDURE — 258N000003 HC RX IP 258 OP 636: Performed by: SURGERY

## 2023-08-15 PROCEDURE — 370N000017 HC ANESTHESIA TECHNICAL FEE, PER MIN: Performed by: SURGERY

## 2023-08-15 RX ORDER — NALOXONE HYDROCHLORIDE 0.4 MG/ML
0.4 INJECTION, SOLUTION INTRAMUSCULAR; INTRAVENOUS; SUBCUTANEOUS
Status: DISCONTINUED | OUTPATIENT
Start: 2023-08-15 | End: 2023-08-15 | Stop reason: HOSPADM

## 2023-08-15 RX ORDER — LIDOCAINE 40 MG/G
CREAM TOPICAL
Status: DISCONTINUED | OUTPATIENT
Start: 2023-08-15 | End: 2023-08-15 | Stop reason: HOSPADM

## 2023-08-15 RX ORDER — NALOXONE HYDROCHLORIDE 0.4 MG/ML
0.2 INJECTION, SOLUTION INTRAMUSCULAR; INTRAVENOUS; SUBCUTANEOUS
Status: DISCONTINUED | OUTPATIENT
Start: 2023-08-15 | End: 2023-08-15 | Stop reason: HOSPADM

## 2023-08-15 RX ORDER — SODIUM CHLORIDE, SODIUM LACTATE, POTASSIUM CHLORIDE, CALCIUM CHLORIDE 600; 310; 30; 20 MG/100ML; MG/100ML; MG/100ML; MG/100ML
INJECTION, SOLUTION INTRAVENOUS CONTINUOUS
Status: DISCONTINUED | OUTPATIENT
Start: 2023-08-15 | End: 2023-08-15 | Stop reason: HOSPADM

## 2023-08-15 RX ORDER — PROCHLORPERAZINE MALEATE 10 MG
10 TABLET ORAL EVERY 6 HOURS PRN
Status: DISCONTINUED | OUTPATIENT
Start: 2023-08-15 | End: 2023-08-15 | Stop reason: HOSPADM

## 2023-08-15 RX ORDER — ONDANSETRON 4 MG/1
4 TABLET, ORALLY DISINTEGRATING ORAL EVERY 6 HOURS PRN
Status: DISCONTINUED | OUTPATIENT
Start: 2023-08-15 | End: 2023-08-15 | Stop reason: HOSPADM

## 2023-08-15 RX ORDER — FLUMAZENIL 0.1 MG/ML
0.2 INJECTION, SOLUTION INTRAVENOUS
Status: DISCONTINUED | OUTPATIENT
Start: 2023-08-15 | End: 2023-08-15 | Stop reason: HOSPADM

## 2023-08-15 RX ORDER — ONDANSETRON 2 MG/ML
4 INJECTION INTRAMUSCULAR; INTRAVENOUS EVERY 6 HOURS PRN
Status: DISCONTINUED | OUTPATIENT
Start: 2023-08-15 | End: 2023-08-15 | Stop reason: HOSPADM

## 2023-08-15 RX ADMIN — LIDOCAINE HYDROCHLORIDE 0.1 ML: 10 INJECTION, SOLUTION EPIDURAL; INFILTRATION; INTRACAUDAL; PERINEURAL at 09:30

## 2023-08-15 RX ADMIN — SODIUM CHLORIDE, POTASSIUM CHLORIDE, SODIUM LACTATE AND CALCIUM CHLORIDE: 600; 310; 30; 20 INJECTION, SOLUTION INTRAVENOUS at 09:30

## 2023-08-15 ASSESSMENT — ACTIVITIES OF DAILY LIVING (ADL)
ADLS_ACUITY_SCORE: 35
ADLS_ACUITY_SCORE: 35

## 2023-08-15 NOTE — ANESTHESIA CARE TRANSFER NOTE
Patient: Paul Canseco    Procedure: Procedure(s):  COLONOSCOPY       Diagnosis: Colon cancer screening [Z12.11]  Diagnosis Additional Information: No value filed.    Anesthesia Type:   No value filed.     Note:    Oropharynx: oropharynx clear of all foreign objects and spontaneously breathing  Level of Consciousness: awake  Oxygen Supplementation: room air    Independent Airway: airway patency satisfactory and stable  Dentition: dentition unchanged  Vital Signs Stable: post-procedure vital signs reviewed and stable  Report to RN Given: handoff report given  Patient transferred to: Phase II    Handoff Report: Identifed the Patient, Identified the Reponsible Provider, Reviewed the pertinent medical history, Discussed the surgical course, Reviewed Intra-OP anesthesia mangement and issues during anesthesia, Set expectations for post-procedure period and Allowed opportunity for questions and acknowledgement of understanding      Vitals:  Vitals Value Taken Time   BP     Temp     Pulse     Resp     SpO2         Electronically Signed By: LIZZETTE Rowan CRNA  August 15, 2023  10:33 AM

## 2023-08-15 NOTE — H&P
ENDOSCOPY PRE-SEDATION H&P FOR OUTPATIENT PROCEDURES    Paul Canseco  [id number]  1960    Procedure:   Colonoscopy possible biopsy, possible polypectomy, with MAC sedation.     Pre-procedure diagnosis: screen    Past medical history: No past medical history on file.    Past surgical history:   Past Surgical History:   Procedure Laterality Date    COLONOSCOPY  2/29/2012    Procedure:COLONOSCOPY; Colonoscopy-Dr. Jaeger; Surgeon:SOTO TOM; Location:[facility]    VASECTOMY         No current facility-administered medications for this encounter.       Allergies   Allergen Reactions    Pcn [Penicillins] Other (See Comments)     Passed out as child       History of Anesthesia/Sedation Problems: no    Physical Exam:    Mental status: alert  Heart: Normal  Lung: Normal  Assessment of patient's airway: Normal  Other as pertinent for procedure:     ASA Score: See Provation note    Mallampati score:  I - Faucial pillars, soft palate, and uvula are visible    Assessment/Plan:     The patient is an appropriate candidate to receive sedation.    Informed consent was discussed with the patient/family, including the risks, benefits, potential complications and any alternative options associated with sedation.    Patient assessment completed just prior to sedation and while under constant observation by the provider. Condition determined to be adequate for proceeding with sedation.    The specific risks for the procedure were discussed with the patient at the time of informed consent and include but are not limited to perforation which could require surgery, missing significant neoplasm or lesion, hemorrhage and adverse sedative complication.      Williams Jacobs MD

## 2023-08-15 NOTE — ANESTHESIA POSTPROCEDURE EVALUATION
Patient: Paul Canseco    Procedure: Procedure(s):  COLONOSCOPY       Anesthesia Type:  General    Note:  Disposition: Outpatient   Postop Pain Control: Uneventful            Sign Out: Well controlled pain   PONV:    Neuro/Psych: Uneventful            Sign Out: Acceptable/Baseline neuro status   Airway/Respiratory: Uneventful            Sign Out: Acceptable/Baseline resp. status   CV/Hemodynamics: Uneventful            Sign Out: Acceptable CV status; No obvious hypovolemia; No obvious fluid overload   Other NRE: NONE   DID A NON-ROUTINE EVENT OCCUR? No           Last vitals:  Vitals Value Taken Time   /72 08/15/23 1045   Temp     Pulse 55 08/15/23 1045   Resp     SpO2 98 % 08/15/23 1045   Vitals shown include unvalidated device data.    Electronically Signed By: LIZZETTE Rowan CRNA  August 15, 2023  10:53 AM

## 2024-06-16 ENCOUNTER — HEALTH MAINTENANCE LETTER (OUTPATIENT)
Age: 64
End: 2024-06-16

## 2025-05-14 ENCOUNTER — LAB REQUISITION (OUTPATIENT)
Dept: LAB | Facility: CLINIC | Age: 65
End: 2025-05-14

## 2025-05-14 DIAGNOSIS — I10 ESSENTIAL (PRIMARY) HYPERTENSION: ICD-10-CM

## 2025-05-14 DIAGNOSIS — E78.2 MIXED HYPERLIPIDEMIA: ICD-10-CM

## 2025-05-14 DIAGNOSIS — Z12.5 ENCOUNTER FOR SCREENING FOR MALIGNANT NEOPLASM OF PROSTATE: ICD-10-CM

## 2025-05-14 PROCEDURE — 82465 ASSAY BLD/SERUM CHOLESTEROL: CPT | Performed by: FAMILY MEDICINE

## 2025-05-14 PROCEDURE — G0103 PSA SCREENING: HCPCS | Performed by: FAMILY MEDICINE

## 2025-05-14 PROCEDURE — 80053 COMPREHEN METABOLIC PANEL: CPT | Performed by: FAMILY MEDICINE

## 2025-05-15 LAB
ALBUMIN SERPL BCG-MCNC: 4.5 G/DL (ref 3.5–5.2)
ALP SERPL-CCNC: 63 U/L (ref 40–150)
ALT SERPL W P-5'-P-CCNC: 18 U/L (ref 0–70)
ANION GAP SERPL CALCULATED.3IONS-SCNC: 11 MMOL/L (ref 7–15)
AST SERPL W P-5'-P-CCNC: 23 U/L (ref 0–45)
BILIRUB SERPL-MCNC: 0.5 MG/DL
BUN SERPL-MCNC: 15.6 MG/DL (ref 8–23)
CALCIUM SERPL-MCNC: 9.3 MG/DL (ref 8.8–10.4)
CHLORIDE SERPL-SCNC: 104 MMOL/L (ref 98–107)
CHOLEST SERPL-MCNC: 191 MG/DL
CREAT SERPL-MCNC: 0.92 MG/DL (ref 0.67–1.17)
EGFRCR SERPLBLD CKD-EPI 2021: >90 ML/MIN/1.73M2
FASTING STATUS PATIENT QL REPORTED: YES
FASTING STATUS PATIENT QL REPORTED: YES
GLUCOSE SERPL-MCNC: 104 MG/DL (ref 70–99)
HCO3 SERPL-SCNC: 21 MMOL/L (ref 22–29)
HDLC SERPL-MCNC: 80 MG/DL
LDLC SERPL CALC-MCNC: 98 MG/DL
NONHDLC SERPL-MCNC: 111 MG/DL
POTASSIUM SERPL-SCNC: 4.7 MMOL/L (ref 3.4–5.3)
PROT SERPL-MCNC: 7.1 G/DL (ref 6.4–8.3)
PSA SERPL DL<=0.01 NG/ML-MCNC: 1.83 NG/ML (ref 0–4.5)
SODIUM SERPL-SCNC: 136 MMOL/L (ref 135–145)
TRIGL SERPL-MCNC: 65 MG/DL

## 2025-06-21 ENCOUNTER — HEALTH MAINTENANCE LETTER (OUTPATIENT)
Age: 65
End: 2025-06-21

## 2025-07-30 ENCOUNTER — HOSPITAL ENCOUNTER (OUTPATIENT)
Dept: CT IMAGING | Facility: CLINIC | Age: 65
Discharge: HOME | End: 2025-07-30
Attending: FAMILY MEDICINE
Payer: COMMERCIAL

## 2025-07-30 DIAGNOSIS — F17.200 TOBACCO USE DISORDER: ICD-10-CM

## 2025-07-30 PROCEDURE — 71271 CT THORAX LUNG CANCER SCR C-: CPT

## (undated) RX ORDER — PROPOFOL 10 MG/ML
INJECTION, EMULSION INTRAVENOUS
Status: DISPENSED
Start: 2023-08-15